# Patient Record
Sex: MALE | Race: WHITE | NOT HISPANIC OR LATINO
[De-identification: names, ages, dates, MRNs, and addresses within clinical notes are randomized per-mention and may not be internally consistent; named-entity substitution may affect disease eponyms.]

---

## 2017-08-08 ENCOUNTER — TRANSCRIPTION ENCOUNTER (OUTPATIENT)
Age: 62
End: 2017-08-08

## 2017-09-16 ENCOUNTER — TRANSCRIPTION ENCOUNTER (OUTPATIENT)
Age: 62
End: 2017-09-16

## 2018-12-14 ENCOUNTER — APPOINTMENT (OUTPATIENT)
Dept: CARDIOLOGY | Facility: CLINIC | Age: 63
End: 2018-12-14
Payer: COMMERCIAL

## 2018-12-14 VITALS
HEIGHT: 75 IN | OXYGEN SATURATION: 98 % | SYSTOLIC BLOOD PRESSURE: 122 MMHG | DIASTOLIC BLOOD PRESSURE: 68 MMHG | HEART RATE: 78 BPM | BODY MASS INDEX: 29.84 KG/M2 | WEIGHT: 240 LBS

## 2018-12-14 DIAGNOSIS — Z87.898 PERSONAL HISTORY OF OTHER SPECIFIED CONDITIONS: ICD-10-CM

## 2018-12-14 DIAGNOSIS — M1A.0710 IDIOPATHIC CHRONIC GOUT, RIGHT ANKLE AND FOOT, W/OUT TOPHUS (TOPHI): ICD-10-CM

## 2018-12-14 DIAGNOSIS — Z78.9 OTHER SPECIFIED HEALTH STATUS: ICD-10-CM

## 2018-12-14 DIAGNOSIS — Z83.438 FAMILY HISTORY OF OTHER DISORDER OF LIPOPROTEIN METABOLISM AND OTHER LIPIDEMIA: ICD-10-CM

## 2018-12-14 DIAGNOSIS — Z82.49 FAMILY HISTORY OF ISCHEMIC HEART DISEASE AND OTHER DISEASES OF THE CIRCULATORY SYSTEM: ICD-10-CM

## 2018-12-14 DIAGNOSIS — Z83.79 FAMILY HISTORY OF OTHER DISEASES OF THE DIGESTIVE SYSTEM: ICD-10-CM

## 2018-12-14 PROCEDURE — 99243 OFF/OP CNSLTJ NEW/EST LOW 30: CPT

## 2019-01-14 ENCOUNTER — APPOINTMENT (OUTPATIENT)
Dept: CARDIOLOGY | Facility: CLINIC | Age: 64
End: 2019-01-14
Payer: COMMERCIAL

## 2019-01-14 PROCEDURE — 93015 CV STRESS TEST SUPVJ I&R: CPT

## 2019-01-14 PROCEDURE — 93880 EXTRACRANIAL BILAT STUDY: CPT

## 2019-01-14 PROCEDURE — 93979 VASCULAR STUDY: CPT

## 2019-01-14 PROCEDURE — 93306 TTE W/DOPPLER COMPLETE: CPT

## 2019-01-18 ENCOUNTER — APPOINTMENT (OUTPATIENT)
Dept: CARDIOLOGY | Facility: CLINIC | Age: 64
End: 2019-01-18
Payer: COMMERCIAL

## 2019-01-18 VITALS
SYSTOLIC BLOOD PRESSURE: 134 MMHG | HEART RATE: 68 BPM | BODY MASS INDEX: 30.46 KG/M2 | WEIGHT: 245 LBS | HEIGHT: 75 IN | DIASTOLIC BLOOD PRESSURE: 72 MMHG | OXYGEN SATURATION: 96 %

## 2019-01-18 PROCEDURE — 99214 OFFICE O/P EST MOD 30 MIN: CPT

## 2019-01-29 ENCOUNTER — APPOINTMENT (OUTPATIENT)
Dept: CARDIOLOGY | Facility: CLINIC | Age: 64
End: 2019-01-29
Payer: COMMERCIAL

## 2019-01-29 PROCEDURE — 78452 HT MUSCLE IMAGE SPECT MULT: CPT

## 2019-01-29 PROCEDURE — A9502: CPT

## 2019-01-29 PROCEDURE — 93015 CV STRESS TEST SUPVJ I&R: CPT

## 2019-01-31 ENCOUNTER — APPOINTMENT (OUTPATIENT)
Dept: CARDIOLOGY | Facility: CLINIC | Age: 64
End: 2019-01-31

## 2021-02-10 ENCOUNTER — NON-APPOINTMENT (OUTPATIENT)
Age: 66
End: 2021-02-10

## 2021-02-10 ENCOUNTER — APPOINTMENT (OUTPATIENT)
Dept: CARDIOLOGY | Facility: CLINIC | Age: 66
End: 2021-02-10
Payer: COMMERCIAL

## 2021-02-10 VITALS
HEIGHT: 75 IN | SYSTOLIC BLOOD PRESSURE: 136 MMHG | OXYGEN SATURATION: 95 % | DIASTOLIC BLOOD PRESSURE: 88 MMHG | WEIGHT: 238 LBS | TEMPERATURE: 96.8 F | HEART RATE: 108 BPM | BODY MASS INDEX: 29.59 KG/M2

## 2021-02-10 PROCEDURE — 99214 OFFICE O/P EST MOD 30 MIN: CPT

## 2021-02-10 PROCEDURE — 93000 ELECTROCARDIOGRAM COMPLETE: CPT

## 2021-02-10 PROCEDURE — 99072 ADDL SUPL MATRL&STAF TM PHE: CPT

## 2021-02-10 RX ORDER — ASPIRIN 81 MG
81 TABLET, DELAYED RELEASE (ENTERIC COATED) ORAL
Refills: 0 | Status: DISCONTINUED | COMMUNITY
End: 2021-02-10

## 2021-02-10 RX ORDER — NEBIVOLOL HYDROCHLORIDE 20 MG/1
20 TABLET ORAL
Refills: 0 | Status: DISCONTINUED | COMMUNITY
End: 2021-02-10

## 2021-02-10 NOTE — ASSESSMENT
[FreeTextEntry1] : CHE MEADOWS is a 65 year old M who presents today  with the above history and the following active issues: \par \par ECG reviewed.  Sinus tachycardia.  Otherwise normal ECG.  He was recently stopped of Bystolic and started on metoprolol.  There was few days overlap of not taking any beta-blocker.  Continue heart rate monitoring.  We will schedule echocardiogram to reevaluate ejection fraction.  There is reported alcohol intake.  Cut down on alcohol intake and increase hydration discussed with the patient.  Cut down on caffeine intake.\par \par -Carotid atherosclerosis: Mild, nonobx by ultrasound imaging. No neurologic complaints. On statin  therapy, tolerating well. Continue current rx.  Carotid ultrasound will be scheduled for this year to rule out any progression of carotid disease.  He was taken off aspirin because of anemia.  He is seeing PMD for work-up.\par \par -HTN: Well controlled on exam today. Continue benicar-hct, norvsac, and bystolic rx. Educated patient on low salt diet, alcohol intake in moderation, regular cardiovascular exercise, and weight reduction for improved BP control.\par \par -HLD: Continue management through primary care. Tolerating crestor and tricor well. Continue current rx and adjunctive lifestyle modification measures.\par \par -Mitral and aortic valve calcification: Minimal regurgitation. Normal LVEF. No clinical evidence of CHF. Continue current rx regimen. No SBE prophylaxis required. Surveillance monitoring.\par \par -GERD: Continue PPI rx managed by primary.\par \par -No evidence of aortic aneurysm or significant aortic atherosclerosis. \par \par

## 2021-02-10 NOTE — REASON FOR VISIT
[Follow-Up - Clinic] : a clinic follow-up of [Dyspnea] : dyspnea [Hyperlipidemia] : hyperlipidemia [Hypertension] : hypertension [FreeTextEntry1] : to review cardiovascular testing

## 2021-02-10 NOTE — HISTORY OF PRESENT ILLNESS
[FreeTextEntry1] : 66 y/o male seen today in clinical followup .\par \par PMH of HTN, HLD, mild carotid atherosclerosis, GERD, and gout. \par There is no significant family history of CAD.\par He is a nonsmoker. \par Drink about 5 beers per day.\par \par No history of AF, CHF, MI, coronary revascularization, DM, CVA/TIA, or reactive/obstructive airway disease. 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 This usually resolves within a minute of rest and is not associated with any other symptoms. Denies exertional chest pain or discomfort. Denies orthopnea, weight gain, or LE edema. Denies palpitations, lightheadedness, dizziness, or syncope. Occasionally notes abd discomfort, possibly r/t bloating. Denies acute/enrique abd pain/n/v. Denies any unusual bleeding or black/tarry stools. \par \par \par Testing reviewed in office today performed 1/14/19:\par - ETT: Randy protocol of 7:25 min. 91% MPHR. Peak /80. SOB with exercise. 8 METS, fair for age and gender. 1mm upsloping ST-T wave depressions were noted. \par - Echo: EF >65%, septum 1.2cm, thickened MV and AV with minimal regurgitation.\par - Carotid Sono: Mild nonobx stenosis BL.\par - Abdominal Sono: No significant atherosclerosis or aneurysm of aorta.\par

## 2021-02-10 NOTE — PHYSICAL EXAM
[General Appearance - Well Developed] : well developed [Normal Appearance] : normal appearance [Well Groomed] : well groomed [General Appearance - Well Nourished] : well nourished [No Deformities] : no deformities [General Appearance - In No Acute Distress] : no acute distress [Normal Conjunctiva] : the conjunctiva exhibited no abnormalities [Eyelids - No Xanthelasma] : the eyelids demonstrated no xanthelasmas [No Oral Pallor] : no oral pallor [No Oral Cyanosis] : no oral cyanosis [Respiration, Rhythm And Depth] : normal respiratory rhythm and effort [Exaggerated Use Of Accessory Muscles For Inspiration] : no accessory muscle use [Auscultation Breath Sounds / Voice Sounds] : lungs were clear to auscultation bilaterally [Heart Rate And Rhythm] : heart rate and rhythm were normal [Heart Sounds] : normal S1 and S2 [Edema] : no peripheral edema present [Abdomen Soft] : soft [Abdomen Tenderness] : non-tender [Abdomen Mass (___ Cm)] : no abdominal mass palpated [Abnormal Walk] : normal gait [Gait - Sufficient For Exercise Testing] : the gait was sufficient for exercise testing [Nail Clubbing] : no clubbing of the fingernails [Cyanosis, Localized] : no localized cyanosis [Petechial Hemorrhages (___cm)] : no petechial hemorrhages [Skin Color & Pigmentation] : normal skin color and pigmentation [] : no rash [No Venous Stasis] : no venous stasis [Skin Lesions] : no skin lesions [No Skin Ulcers] : no skin ulcer [No Xanthoma] : no  xanthoma was observed [Oriented To Time, Place, And Person] : oriented to person, place, and time [Affect] : the affect was normal [Mood] : the mood was normal [No Anxiety] : not feeling anxious [FreeTextEntry1] : +2/6 HSM at apex

## 2021-02-10 NOTE — REVIEW OF SYSTEMS
no [Dyspnea on exertion] : dyspnea during exertion [see HPI] : see HPI [Abdominal Pain] : abdominal pain [Negative] : Heme/Lymph [Nausea] : no nausea [Change in Appetite] : no change in appetite [Change In The Stool] : no change in stool

## 2021-02-23 ENCOUNTER — APPOINTMENT (OUTPATIENT)
Dept: CARDIOLOGY | Facility: CLINIC | Age: 66
End: 2021-02-23
Payer: COMMERCIAL

## 2021-02-23 PROCEDURE — 99072 ADDL SUPL MATRL&STAF TM PHE: CPT

## 2021-02-23 PROCEDURE — 93880 EXTRACRANIAL BILAT STUDY: CPT

## 2021-02-23 PROCEDURE — 93306 TTE W/DOPPLER COMPLETE: CPT

## 2021-02-26 ENCOUNTER — APPOINTMENT (OUTPATIENT)
Dept: CARDIOLOGY | Facility: CLINIC | Age: 66
End: 2021-02-26
Payer: COMMERCIAL

## 2021-02-26 VITALS
HEIGHT: 75 IN | SYSTOLIC BLOOD PRESSURE: 148 MMHG | WEIGHT: 235 LBS | BODY MASS INDEX: 29.22 KG/M2 | DIASTOLIC BLOOD PRESSURE: 72 MMHG | OXYGEN SATURATION: 93 % | HEART RATE: 117 BPM

## 2021-02-26 PROCEDURE — 99072 ADDL SUPL MATRL&STAF TM PHE: CPT

## 2021-02-26 PROCEDURE — 99214 OFFICE O/P EST MOD 30 MIN: CPT

## 2021-02-26 NOTE — REVIEW OF SYSTEMS
[Dyspnea on exertion] : dyspnea during exertion [see HPI] : see HPI [Abdominal Pain] : abdominal pain [Negative] : Heme/Lymph [Nausea] : no nausea [Change in Appetite] : no change in appetite [Change In The Stool] : no change in stool

## 2021-02-26 NOTE — ASSESSMENT
[FreeTextEntry1] : CHE MEADOWS is a 65 year old M who presents today  with the above history and the following active issues: \par \par ECG reviewed.  Sinus tachycardia.  Otherwise normal ECG.  He was recently stopped of Bystolic and started on metoprolol.  There was few days overlap of not taking any beta-blocker.  Continue heart rate monitoring.  We will schedule echocardiogram to reevaluate ejection fraction.  There is reported alcohol intake.  Cut down on alcohol intake and increase hydration discussed with the patient.  Cut down on caffeine intake.\par \par -Carotid atherosclerosis: Mild, nonobx by ultrasound imaging. No neurologic complaints. On statin  therapy, tolerating well. Continue current rx.  Carotid ultrasound will be scheduled for this year to rule out any progression of carotid disease.  He was taken off aspirin because of anemia.  He is seeing PMD for work-up.\par \par -HTN: Well controlled on exam today. Continue benicar-hct, norvsac, and bystolic rx. Educated patient on low salt diet, alcohol intake in moderation, regular cardiovascular exercise, and weight reduction for improved BP control.\par \par -HLD: Continue management through primary care. Tolerating crestor and tricor well. Continue current rx and adjunctive lifestyle modification measures.\par \par -Mitral and aortic valve calcification: Minimal regurgitation. Normal LVEF. No clinical evidence of CHF. Continue current rx regimen. No SBE prophylaxis required. Surveillance monitoring.\par \par -GERD: Continue PPI rx managed by primary.\par \par -No evidence of aortic aneurysm or significant aortic atherosclerosis. \par \par Results of recent echocardiogram and carotid ultrasound discussed with the patient.  He admits to high sodium intake.  Low-sodium diet discussed with the patient.  Continue blood pressure monitoring.  If it remains elevated despite of low-sodium diet adjustment of antihypertensive medication will be done.  He wants to follow-up with his primary physician, cardiac follow-up yearly and as needed.\par

## 2022-06-24 ENCOUNTER — NON-APPOINTMENT (OUTPATIENT)
Age: 67
End: 2022-06-24

## 2022-06-24 ENCOUNTER — APPOINTMENT (OUTPATIENT)
Dept: CARDIOLOGY | Facility: CLINIC | Age: 67
End: 2022-06-24
Payer: COMMERCIAL

## 2022-06-24 VITALS
HEIGHT: 75 IN | BODY MASS INDEX: 30.71 KG/M2 | HEART RATE: 105 BPM | DIASTOLIC BLOOD PRESSURE: 90 MMHG | WEIGHT: 247 LBS | TEMPERATURE: 97.5 F | OXYGEN SATURATION: 95 % | SYSTOLIC BLOOD PRESSURE: 142 MMHG

## 2022-06-24 PROCEDURE — 99214 OFFICE O/P EST MOD 30 MIN: CPT

## 2022-06-24 PROCEDURE — 93000 ELECTROCARDIOGRAM COMPLETE: CPT

## 2022-06-24 NOTE — PHYSICAL EXAM
[General Appearance - Well Developed] : well developed [Normal Appearance] : normal appearance [Well Groomed] : well groomed [General Appearance - Well Nourished] : well nourished [No Deformities] : no deformities [General Appearance - In No Acute Distress] : no acute distress [Eyelids - No Xanthelasma] : the eyelids demonstrated no xanthelasmas [No Oral Pallor] : no oral pallor [No Oral Cyanosis] : no oral cyanosis [Respiration, Rhythm And Depth] : normal respiratory rhythm and effort [Exaggerated Use Of Accessory Muscles For Inspiration] : no accessory muscle use [Auscultation Breath Sounds / Voice Sounds] : lungs were clear to auscultation bilaterally [Heart Rate And Rhythm] : heart rate and rhythm were normal [Heart Sounds] : normal S1 and S2 [Edema] : no peripheral edema present [Abdomen Soft] : soft [Abdomen Tenderness] : non-tender [Abdomen Mass (___ Cm)] : no abdominal mass palpated [Abnormal Walk] : normal gait [Gait - Sufficient For Exercise Testing] : the gait was sufficient for exercise testing [Nail Clubbing] : no clubbing of the fingernails [Cyanosis, Localized] : no localized cyanosis [Petechial Hemorrhages (___cm)] : no petechial hemorrhages [Skin Color & Pigmentation] : normal skin color and pigmentation [] : no rash [No Venous Stasis] : no venous stasis [Skin Lesions] : no skin lesions [No Skin Ulcers] : no skin ulcer [No Xanthoma] : no  xanthoma was observed [Oriented To Time, Place, And Person] : oriented to person, place, and time [Affect] : the affect was normal [Mood] : the mood was normal [No Anxiety] : not feeling anxious [Well Developed] : well developed [Well Nourished] : well nourished [No Acute Distress] : no acute distress [Normal Conjunctiva] : normal conjunctiva [Normal Venous Pressure] : normal venous pressure [No Carotid Bruit] : no carotid bruit [Normal S1, S2] : normal S1, S2 [No Murmur] : no murmur [No Rub] : no rub [No Gallop] : no gallop [Clear Lung Fields] : clear lung fields [Good Air Entry] : good air entry [No Respiratory Distress] : no respiratory distress  [Soft] : abdomen soft [Non Tender] : non-tender [No Masses/organomegaly] : no masses/organomegaly [Normal Bowel Sounds] : normal bowel sounds [Normal Gait] : normal gait [No Edema] : no edema [No Cyanosis] : no cyanosis [No Clubbing] : no clubbing [No Varicosities] : no varicosities [No Rash] : no rash [No Skin Lesions] : no skin lesions [Moves all extremities] : moves all extremities [No Focal Deficits] : no focal deficits [Normal Speech] : normal speech [Alert and Oriented] : alert and oriented [Normal memory] : normal memory [FreeTextEntry1] : +2/6 HSM at apex

## 2022-06-24 NOTE — HISTORY OF PRESENT ILLNESS
[FreeTextEntry1] : 65 y/o male seen today in clinical followup .\par \par PMH of HTN, HLD, mild carotid atherosclerosis, GERD, and gout. \par There is no significant family history of CAD.\par He is a nonsmoker. \par Drink about 5 beers per day.\par \par No history of AF, CHF, MI, coronary revascularization, DM, CVA/TIA, or reactive/obstructive airway disease. 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 This usually resolves within a minute of rest and is not associated with any other symptoms. Denies exertional chest pain or discomfort. Denies orthopnea, weight gain, or LE edema. Denies palpitations, lightheadedness, dizziness, or syncope. Occasionally notes abd discomfort, possibly r/t bloating. Denies acute/enrique abd pain/n/v. Denies any unusual bleeding or black/tarry stools. \par \par \par Testing reviewed in office today performed 1/14/19:\par - ETT: Randy protocol of 7:25 min. 91% MPHR. Peak /80. SOB with exercise. 8 METS, fair for age and gender. 1mm upsloping ST-T wave depressions were noted. \par - Echo: EF >65%, septum 1.2cm, thickened MV and AV with minimal regurgitation.\par - Carotid Sono: Mild nonobx stenosis BL.\par - Abdominal Sono: No significant atherosclerosis or aneurysm of aorta.\par

## 2022-06-24 NOTE — ASSESSMENT
[FreeTextEntry1] : CHE MEADOWS is a 66 year old M who presents today  with the above history and the following active issues: \par \par ECG reviewed.  Sinus tachycardia.  Otherwise normal ECG.  He was recently stopped of Bystolic and started on metoprolol.  There was few days overlap of not taking any beta-blocker.  Continue heart rate monitoring.  We will schedule echocardiogram to reevaluate ejection fraction.  There is reported alcohol intake.  Cut down on alcohol intake and increase hydration discussed with the patient.  Cut down on caffeine intake.\par \par -Carotid atherosclerosis: Mild, nonobx by ultrasound imaging. No neurologic complaints. On statin  therapy, tolerating well. Continue current rx.  Carotid ultrasound will be scheduled for this year to rule out any progression of carotid disease.  He was taken off aspirin because of anemia.  He is seeing PMD for work-up.\par \par -HTN: Well controlled on exam today. Continue benicar-hct, norvsac, and bystolic rx. Educated patient on low salt diet, alcohol intake in moderation, regular cardiovascular exercise, and weight reduction for improved BP control.\par \par -HLD: Continue management through primary care. Tolerating crestor and tricor well. Continue current rx and adjunctive lifestyle modification measures.\par \par -Mitral and aortic valve calcification: Minimal regurgitation. Normal LVEF. No clinical evidence of CHF. Continue current rx regimen. No SBE prophylaxis required. Surveillance monitoring.\par \par -GERD: Continue PPI rx managed by primary.\par \par -No evidence of aortic aneurysm or significant aortic atherosclerosis. \par \par Results of last year echocardiogram and carotid ultrasound discussed with the patient.  He admits to high sodium intake.  Low-sodium diet discussed with the patient.  Continue blood pressure monitoring.  We will increase the dose of amlodipine to 10 mg daily.  Shortness of breath on exertion since COVID infection.  Echocardiogram will be scheduled to evaluate ejection fraction and rule out any progression of valvulopathy.  Follow-up after echocardiogram.\par

## 2022-07-08 ENCOUNTER — APPOINTMENT (OUTPATIENT)
Dept: CARDIOLOGY | Facility: CLINIC | Age: 67
End: 2022-07-08

## 2022-07-08 VITALS
BODY MASS INDEX: 30.59 KG/M2 | HEART RATE: 108 BPM | DIASTOLIC BLOOD PRESSURE: 72 MMHG | OXYGEN SATURATION: 96 % | RESPIRATION RATE: 14 BRPM | WEIGHT: 246 LBS | HEIGHT: 75 IN | TEMPERATURE: 98.4 F | SYSTOLIC BLOOD PRESSURE: 146 MMHG

## 2022-07-08 DIAGNOSIS — R94.39 ABNORMAL RESULT OF OTHER CARDIOVASCULAR FUNCTION STUDY: ICD-10-CM

## 2022-07-08 PROCEDURE — 99214 OFFICE O/P EST MOD 30 MIN: CPT

## 2022-07-08 PROCEDURE — 93306 TTE W/DOPPLER COMPLETE: CPT

## 2022-07-08 RX ORDER — OLMESARTAN MEDOXOMIL-HYDROCHLOROTHIAZIDE 12.5; 4 MG/1; MG/1
40-12.5 TABLET, FILM COATED ORAL
Refills: 0 | Status: DISCONTINUED | COMMUNITY
End: 2022-07-08

## 2022-07-08 RX ORDER — AMLODIPINE BESYLATE 5 MG/1
5 TABLET ORAL
Refills: 0 | Status: DISCONTINUED | COMMUNITY
End: 2022-07-08

## 2022-07-08 NOTE — HISTORY OF PRESENT ILLNESS
[FreeTextEntry1] : 67 y/o male seen today in clinical followup .\par \par PMH of HTN, HLD, mild carotid atherosclerosis, GERD, and gout. \par There is no significant family history of CAD.\par He is a nonsmoker. \par Drink about 5 beers per day.\par \par No history of AF, CHF, MI, coronary revascularization, DM, CVA/TIA, or reactive/obstructive airway disease. 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 This usually resolves within a minute of rest and is not associated with any other symptoms. Denies exertional chest pain or discomfort. Denies orthopnea, weight gain, or LE edema. Denies palpitations, lightheadedness, dizziness, or syncope. Occasionally notes abd discomfort, possibly r/t bloating. Denies acute/enrique abd pain/n/v. Denies any unusual bleeding or black/tarry stools. \par \par \par Testing reviewed in office today performed 1/14/19:\par - ETT: Randy protocol of 7:25 min. 91% MPHR. Peak /80. SOB with exercise. 8 METS, fair for age and gender. 1mm upsloping ST-T wave depressions were noted. \par - Echo: EF >65%, septum 1.2cm, thickened MV and AV with minimal regurgitation.\par - Carotid Sono: Mild nonobx stenosis BL.\par - Abdominal Sono: No significant atherosclerosis or aneurysm of aorta.\par

## 2022-07-08 NOTE — PHYSICAL EXAM
[Well Developed] : well developed [Well Nourished] : well nourished [No Acute Distress] : no acute distress [Normal Venous Pressure] : normal venous pressure [No Carotid Bruit] : no carotid bruit [Normal S1, S2] : normal S1, S2 [No Murmur] : no murmur [No Rub] : no rub [No Gallop] : no gallop [Clear Lung Fields] : clear lung fields [Good Air Entry] : good air entry [No Respiratory Distress] : no respiratory distress  [Soft] : abdomen soft [Non Tender] : non-tender [No Masses/organomegaly] : no masses/organomegaly [Normal Bowel Sounds] : normal bowel sounds [Normal Gait] : normal gait [No Edema] : no edema [No Cyanosis] : no cyanosis [No Clubbing] : no clubbing [No Varicosities] : no varicosities [No Rash] : no rash [No Skin Lesions] : no skin lesions [Moves all extremities] : moves all extremities [No Focal Deficits] : no focal deficits [Normal Speech] : normal speech [Alert and Oriented] : alert and oriented [Normal memory] : normal memory [General Appearance - Well Developed] : well developed [Normal Appearance] : normal appearance [Well Groomed] : well groomed [General Appearance - Well Nourished] : well nourished [No Deformities] : no deformities [General Appearance - In No Acute Distress] : no acute distress [Normal Conjunctiva] : the conjunctiva exhibited no abnormalities [Eyelids - No Xanthelasma] : the eyelids demonstrated no xanthelasmas [No Oral Pallor] : no oral pallor [No Oral Cyanosis] : no oral cyanosis [Respiration, Rhythm And Depth] : normal respiratory rhythm and effort [Exaggerated Use Of Accessory Muscles For Inspiration] : no accessory muscle use [Auscultation Breath Sounds / Voice Sounds] : lungs were clear to auscultation bilaterally [Heart Rate And Rhythm] : heart rate and rhythm were normal [Heart Sounds] : normal S1 and S2 [Edema] : no peripheral edema present [Abdomen Soft] : soft [Abdomen Tenderness] : non-tender [Abdomen Mass (___ Cm)] : no abdominal mass palpated [Abnormal Walk] : normal gait [Gait - Sufficient For Exercise Testing] : the gait was sufficient for exercise testing [Nail Clubbing] : no clubbing of the fingernails [Cyanosis, Localized] : no localized cyanosis [Petechial Hemorrhages (___cm)] : no petechial hemorrhages [Skin Color & Pigmentation] : normal skin color and pigmentation [] : no rash [No Venous Stasis] : no venous stasis [Skin Lesions] : no skin lesions [No Skin Ulcers] : no skin ulcer [No Xanthoma] : no  xanthoma was observed [Oriented To Time, Place, And Person] : oriented to person, place, and time [Affect] : the affect was normal [Mood] : the mood was normal [No Anxiety] : not feeling anxious [FreeTextEntry1] : +2/6 HSM at apex

## 2022-07-08 NOTE — DISCUSSION/SUMMARY
[FreeTextEntry1] : Results of echocardiogram which was done today discussed with the patient.  Stable results.  Normal ejection fraction.  Clinically doing very well.  He will continue with the present medications.  Cardiac follow-up yearly and as needed.

## 2023-07-11 ENCOUNTER — NON-APPOINTMENT (OUTPATIENT)
Age: 68
End: 2023-07-11

## 2023-07-11 ENCOUNTER — APPOINTMENT (OUTPATIENT)
Dept: CARDIOLOGY | Facility: CLINIC | Age: 68
End: 2023-07-11
Payer: COMMERCIAL

## 2023-07-11 VITALS
OXYGEN SATURATION: 94 % | WEIGHT: 248 LBS | HEART RATE: 102 BPM | BODY MASS INDEX: 30.84 KG/M2 | SYSTOLIC BLOOD PRESSURE: 154 MMHG | HEIGHT: 75 IN | DIASTOLIC BLOOD PRESSURE: 70 MMHG

## 2023-07-11 PROCEDURE — 99214 OFFICE O/P EST MOD 30 MIN: CPT

## 2023-07-11 NOTE — PHYSICAL EXAM
[Well Developed] : well developed [Well Nourished] : well nourished [No Acute Distress] : no acute distress [Normal Venous Pressure] : normal venous pressure [No Carotid Bruit] : no carotid bruit [Normal S1, S2] : normal S1, S2 [No Murmur] : no murmur [No Rub] : no rub [No Gallop] : no gallop [Clear Lung Fields] : clear lung fields [Good Air Entry] : good air entry [No Respiratory Distress] : no respiratory distress  [Soft] : abdomen soft [Non Tender] : non-tender [No Masses/organomegaly] : no masses/organomegaly [Normal Bowel Sounds] : normal bowel sounds [Normal Gait] : normal gait [No Edema] : no edema [No Cyanosis] : no cyanosis [No Clubbing] : no clubbing [No Varicosities] : no varicosities [No Rash] : no rash [No Skin Lesions] : no skin lesions [Moves all extremities] : moves all extremities [No Focal Deficits] : no focal deficits [Normal Speech] : normal speech [Alert and Oriented] : alert and oriented [Normal memory] : normal memory [General Appearance - Well Developed] : well developed [Normal Appearance] : normal appearance [Well Groomed] : well groomed [General Appearance - Well Nourished] : well nourished [No Deformities] : no deformities [General Appearance - In No Acute Distress] : no acute distress [Normal Conjunctiva] : the conjunctiva exhibited no abnormalities [Eyelids - No Xanthelasma] : the eyelids demonstrated no xanthelasmas [No Oral Pallor] : no oral pallor [No Oral Cyanosis] : no oral cyanosis [Respiration, Rhythm And Depth] : normal respiratory rhythm and effort [Exaggerated Use Of Accessory Muscles For Inspiration] : no accessory muscle use [Auscultation Breath Sounds / Voice Sounds] : lungs were clear to auscultation bilaterally [Heart Rate And Rhythm] : heart rate and rhythm were normal [Heart Sounds] : normal S1 and S2 [Edema] : no peripheral edema present [FreeTextEntry1] : +2/6 HSM at apex [Abdomen Soft] : soft [Abdomen Tenderness] : non-tender [Abdomen Mass (___ Cm)] : no abdominal mass palpated [Abnormal Walk] : normal gait [Gait - Sufficient For Exercise Testing] : the gait was sufficient for exercise testing [Nail Clubbing] : no clubbing of the fingernails [Cyanosis, Localized] : no localized cyanosis [Petechial Hemorrhages (___cm)] : no petechial hemorrhages [Skin Color & Pigmentation] : normal skin color and pigmentation [] : no rash [No Venous Stasis] : no venous stasis [Skin Lesions] : no skin lesions [No Skin Ulcers] : no skin ulcer [No Xanthoma] : no  xanthoma was observed [Oriented To Time, Place, And Person] : oriented to person, place, and time [Affect] : the affect was normal [Mood] : the mood was normal [No Anxiety] : not feeling anxious

## 2023-07-11 NOTE — ASSESSMENT
[FreeTextEntry1] : CHE MEADOWS is a 67 year old M who presents today  with the above history and the following active issues: \par \par ECG reviewed.  Sinus tachycardia.  Otherwise normal ECG.  He was recently stopped of Bystolic and started on metoprolol.  There was few days overlap of not taking any beta-blocker.  Continue heart rate monitoring.  We will schedule echocardiogram to reevaluate ejection fraction.  There is reported alcohol intake.  Cut down on alcohol intake and increase hydration discussed with the patient.  Cut down on caffeine intake.\par \par -Carotid atherosclerosis: Mild, nonobx by ultrasound imaging. No neurologic complaints. On statin  therapy, tolerating well. Continue current rx.  Carotid ultrasound will be scheduled for this year to rule out any progression of carotid disease.  He was taken off aspirin because of anemia.  He is seeing PMD for work-up.\par \par -HTN: Well controlled on exam today. Continue benicar-hct, norvsac, and bystolic rx. Educated patient on low salt diet, alcohol intake in moderation, regular cardiovascular exercise, and weight reduction for improved BP control.\par \par -HLD: Continue management through primary care. Tolerating crestor and tricor well. Continue current rx and adjunctive lifestyle modification measures.\par \par -Mitral and aortic valve calcification: Minimal regurgitation. Normal LVEF. No clinical evidence of CHF. Continue current rx regimen. No SBE prophylaxis required. Surveillance monitoring.\par \par -GERD: Continue PPI rx managed by primary.\par \par -No evidence of aortic aneurysm or significant aortic atherosclerosis. \par \par Results of last year echocardiogram and carotid ultrasound discussed with the patient.  He admits to high sodium intake.  Low-sodium diet discussed with the patient.  Continue blood pressure monitoring.  We will increase the dose of amlodipine to 10 mg daily.  Shortness of breath on exertion since COVID infection.  Echocardiogram will be scheduled to evaluate ejection fraction and rule out any progression of valvulopathy.  Follow-up after echocardiogram.\par

## 2024-04-12 ENCOUNTER — APPOINTMENT (OUTPATIENT)
Dept: CARDIOLOGY | Facility: CLINIC | Age: 69
End: 2024-04-12
Payer: COMMERCIAL

## 2024-04-12 ENCOUNTER — NON-APPOINTMENT (OUTPATIENT)
Age: 69
End: 2024-04-12

## 2024-04-12 VITALS
DIASTOLIC BLOOD PRESSURE: 64 MMHG | BODY MASS INDEX: 30.46 KG/M2 | SYSTOLIC BLOOD PRESSURE: 132 MMHG | HEIGHT: 75 IN | OXYGEN SATURATION: 97 % | WEIGHT: 245 LBS | HEART RATE: 98 BPM

## 2024-04-12 DIAGNOSIS — R01.1 CARDIAC MURMUR, UNSPECIFIED: ICD-10-CM

## 2024-04-12 DIAGNOSIS — I65.23 OCCLUSION AND STENOSIS OF BILATERAL CAROTID ARTERIES: ICD-10-CM

## 2024-04-12 DIAGNOSIS — R06.02 SHORTNESS OF BREATH: ICD-10-CM

## 2024-04-12 LAB — HBA1C MFR BLD HPLC: 5.4

## 2024-04-12 PROCEDURE — 99204 OFFICE O/P NEW MOD 45 MIN: CPT

## 2024-04-12 PROCEDURE — G2211 COMPLEX E/M VISIT ADD ON: CPT

## 2024-04-12 PROCEDURE — 93000 ELECTROCARDIOGRAM COMPLETE: CPT

## 2024-04-12 RX ORDER — METOPROLOL SUCCINATE 25 MG/1
25 TABLET, EXTENDED RELEASE ORAL
Qty: 90 | Refills: 2 | Status: ACTIVE | COMMUNITY

## 2024-04-12 RX ORDER — ROSUVASTATIN CALCIUM 20 MG/1
20 TABLET, FILM COATED ORAL
Qty: 90 | Refills: 3 | Status: ACTIVE | COMMUNITY

## 2024-04-12 RX ORDER — AMLODIPINE BESYLATE 10 MG/1
10 TABLET ORAL DAILY
Qty: 90 | Refills: 2 | Status: ACTIVE | COMMUNITY
End: 2025-01-07

## 2024-04-12 RX ORDER — VALSARTAN 160 MG/1
160 TABLET, COATED ORAL DAILY
Qty: 90 | Refills: 2 | Status: ACTIVE | COMMUNITY
Start: 2024-04-12

## 2024-04-12 RX ORDER — FENOFIBRATE 160 MG/1
160 TABLET ORAL DAILY
Qty: 90 | Refills: 2 | Status: ACTIVE | COMMUNITY

## 2024-04-12 NOTE — DISCUSSION/SUMMARY
[FreeTextEntry1] : 61-year-old gentleman with hypertension hyperlipidemia and mild atherosclerotic disease presenting today for routine cardiovascular follow-up.  Was previously followed by Dr. Redding.  A1c at target.  Blood pressure in the stage I hypertension range with systolics in the 135 mmHg to 140 mmHg.  Patient is on hydrochlorothiazide which if possible should be changed given his age.  I also do not believe he was benefiting from the metoprolol succinate 50 mg --I reduce that dose to half.  We do not have an active lipid panel nor has his thyroid ever been checked as he has difficulty losing weight.  Plan: - Continue amlodipine 10 mg every evening - Continue Crestor 20 mg daily - Stop olmesartan-hydrochlorothiazide; start valsartan 160 mg every evening - Reduce metoprolol succinate 50 mg to 25 mg every evening - Obtain lipid panel and TSH with reflex T4  RTC: 1 month  Appreciate the opportunity to participate in the care of Mr. MEADOWS. Strict ER precautions were provided to patient for symptoms that arise or worsen. Please do not hesitate to reach out with any questions, concerns, or changes in clinical status.   Lazaro Cruz MD, FACC  Interventional & Clinical Cardiology  [EKG obtained to assist in diagnosis and management of assessed problem(s)] : EKG obtained to assist in diagnosis and management of assessed problem(s)

## 2024-04-12 NOTE — CARDIOLOGY SUMMARY
[de-identified] : Nuclear stress test (01/29/2019): Fixed inferior wall perfusion defect consistent with diaphragmatic attenuation.  Normal LV systolic function.  [de-identified] : 07/08/2022: TTE shows grossly normal left ventricular systolic function size and wall thickness.  Normal valvular structure and function [de-identified] : Labs 03/25/2024: A1c 5.4  Labs 03/25/2022: Hemoglobin 14.1, HDL 51, LDL 89, total cholesterol 173.

## 2024-04-12 NOTE — HISTORY OF PRESENT ILLNESS
[FreeTextEntry1] : 66-year-old gentleman previously patient of Dr. Redding who presents today to our office for cardiovascular follow-up. Patient's medical issues include hypertension / hyperlipidemia.  Testing performed in January 2019 included a treadmill stress test that was negative for ischemia.  8 METS achieved at that time.  Normal TTE with mild LVH (septal wall thickness 1.2 cm), carotid sono that showed mild nonobstructive disease bilaterally, abdominal sono that showed no significant atherosclerosis or aneurysm of the aorta.  We reviewed patient's medications at this visit there are some changes that we can make to consolidate his medications.  Maintaining adequate blood pressure control and also minimizing polypharmacy.  There is no indication for him to be on metoprolol.  Significant dietary and exercise minimum requirement were discussed.  He endorses no chest pain palpitation shortness of breath leg swelling orthopnea.  He snores minimally and does not feel unrested upon awakening

## 2024-04-16 ENCOUNTER — NON-APPOINTMENT (OUTPATIENT)
Age: 69
End: 2024-04-16

## 2024-04-16 ENCOUNTER — APPOINTMENT (OUTPATIENT)
Dept: OPHTHALMOLOGY | Facility: CLINIC | Age: 69
End: 2024-04-16
Payer: COMMERCIAL

## 2024-04-16 PROCEDURE — 92014 COMPRE OPH EXAM EST PT 1/>: CPT

## 2024-05-10 ENCOUNTER — APPOINTMENT (OUTPATIENT)
Dept: CARDIOLOGY | Facility: CLINIC | Age: 69
End: 2024-05-10
Payer: COMMERCIAL

## 2024-05-10 VITALS
SYSTOLIC BLOOD PRESSURE: 130 MMHG | OXYGEN SATURATION: 96 % | DIASTOLIC BLOOD PRESSURE: 68 MMHG | HEIGHT: 75 IN | WEIGHT: 248 LBS | HEART RATE: 104 BPM | BODY MASS INDEX: 30.84 KG/M2

## 2024-05-10 DIAGNOSIS — E78.5 HYPERLIPIDEMIA, UNSPECIFIED: ICD-10-CM

## 2024-05-10 DIAGNOSIS — Z00.00 ENCOUNTER FOR GENERAL ADULT MEDICAL EXAMINATION W/OUT ABNORMAL FINDINGS: ICD-10-CM

## 2024-05-10 DIAGNOSIS — K21.9 GASTRO-ESOPHAGEAL REFLUX DISEASE W/OUT ESOPHAGITIS: ICD-10-CM

## 2024-05-10 DIAGNOSIS — I10 ESSENTIAL (PRIMARY) HYPERTENSION: ICD-10-CM

## 2024-05-10 PROCEDURE — G2211 COMPLEX E/M VISIT ADD ON: CPT

## 2024-05-10 PROCEDURE — 99214 OFFICE O/P EST MOD 30 MIN: CPT

## 2024-05-10 RX ORDER — OMEPRAZOLE 20 MG/1
20 CAPSULE, DELAYED RELEASE ORAL
Qty: 90 | Refills: 3 | Status: ACTIVE | COMMUNITY

## 2024-05-10 NOTE — CARDIOLOGY SUMMARY
[de-identified] : Nuclear stress test (01/29/2019): Fixed inferior wall perfusion defect consistent with diaphragmatic attenuation.  Normal LV systolic function.  [de-identified] : 07/08/2022: TTE shows grossly normal left ventricular systolic function size and wall thickness.  Normal valvular structure and function [de-identified] : Labs 03/25/2024: A1c 5.4  Labs 03/25/2022: Hemoglobin 14.1, HDL 51, LDL 89, total cholesterol 173.

## 2024-05-10 NOTE — DISCUSSION/SUMMARY
[FreeTextEntry1] : 68 year old gentleman with the following issues:   # HTN; almost optimized -- switch meds to night time for greater efficacy.   Plan: - Continue amlodipine 10 mg every evening - Continue Crestor 20 mg daily - Continue valsartan 160 mg every evening - Continue metoprolol succinate 25 mg every evening   RTC: 3 month  Appreciate the opportunity to participate in the care of Mr. MEADOWS. Strict ER precautions were provided to patient for symptoms that arise or worsen. Please do not hesitate to reach out with any questions, concerns, or changes in clinical status.   Lazaro Cruz MD, [facility]  Interventional & Clinical Cardiology  No

## 2024-05-10 NOTE — HISTORY OF PRESENT ILLNESS
[FreeTextEntry1] : 66-year-old gentleman previously patient of Dr. Redding who presents today to our office for cardiovascular follow-up. Patient's medical issues include hypertension / hyperlipidemia.  Testing performed in January 2019 included a treadmill stress test that was negative for ischemia.  8 METS achieved at that time.  Normal TTE with mild LVH (septal wall thickness 1.2 cm), carotid sono that showed mild nonobstructive disease bilaterally, abdominal sono that showed no significant atherosclerosis or aneurysm of the aorta.  BP well controlled on today's visit -- 130/80. Asked patient to switch BP meds to nighttime dosing for greater efficacy.

## 2024-09-26 ENCOUNTER — NON-APPOINTMENT (OUTPATIENT)
Age: 69
End: 2024-09-26

## 2024-09-27 ENCOUNTER — APPOINTMENT (OUTPATIENT)
Dept: CARDIOLOGY | Facility: CLINIC | Age: 69
End: 2024-09-27
Payer: COMMERCIAL

## 2024-09-27 ENCOUNTER — APPOINTMENT (OUTPATIENT)
Dept: CARDIOLOGY | Facility: CLINIC | Age: 69
End: 2024-09-27

## 2024-09-27 VITALS
SYSTOLIC BLOOD PRESSURE: 142 MMHG | DIASTOLIC BLOOD PRESSURE: 60 MMHG | OXYGEN SATURATION: 96 % | WEIGHT: 250 LBS | HEIGHT: 75 IN | HEART RATE: 108 BPM | BODY MASS INDEX: 31.08 KG/M2

## 2024-09-27 DIAGNOSIS — K21.9 GASTRO-ESOPHAGEAL REFLUX DISEASE W/OUT ESOPHAGITIS: ICD-10-CM

## 2024-09-27 DIAGNOSIS — M1A.0710 IDIOPATHIC CHRONIC GOUT, RIGHT ANKLE AND FOOT, W/OUT TOPHUS (TOPHI): ICD-10-CM

## 2024-09-27 DIAGNOSIS — E78.5 HYPERLIPIDEMIA, UNSPECIFIED: ICD-10-CM

## 2024-09-27 DIAGNOSIS — I10 ESSENTIAL (PRIMARY) HYPERTENSION: ICD-10-CM

## 2024-09-27 PROCEDURE — G2211 COMPLEX E/M VISIT ADD ON: CPT | Mod: NC

## 2024-09-27 PROCEDURE — 99214 OFFICE O/P EST MOD 30 MIN: CPT

## 2024-09-27 RX ORDER — OLMESARTAN MEDOXOMIL AND HYDROCHLOROTHIAZIDE 40; 12.5 MG/1; MG/1
40-12.5 TABLET ORAL DAILY
Qty: 1 | Refills: 2 | Status: ACTIVE | COMMUNITY
Start: 2024-09-27 | End: 1900-01-01

## 2024-09-27 NOTE — DISCUSSION/SUMMARY
[FreeTextEntry1] :  68 year old gentleman who warrants more aggressive antihypertensive therapies -- one that doesn't include amlodipine 10mg daily as it caused him noticeable ankle swelling. Renal function very reassuring and given his habitus, PO intake, age, normal electrolytes and Cr -- would be lower risk from adverse effect of thiazide antihypertensive.   Plan:   - Stop amlodipine 10 mg qPM   - Stop valsartan 160 mg qPM   - Start olmesartan 40 mg - HCTZ 12.5 mg daily today   - Repeat BP check and BMP check at PCP's office if possible happening this Thursday Oct 4th.   - If SBP > 130 mmHg, add back lower dose of amlo 5 mg qPM which should not cause swelling   - Follow-up over phone in two weeks; call-back provided.   RTC: 1-2 months  Appreciate the opportunity to participate in the care of Mr. MEADOWS. Strict ER precautions were provided to patient for symptoms that arise or worsen. Please do not hesitate to reach out with any questions, concerns, or changes in clinical status.   Lazaro Cruz MD, FACC  Interventional & Clinical Cardiology

## 2024-09-27 NOTE — HISTORY OF PRESENT ILLNESS
[FreeTextEntry1] :  68 year old gentleman with HTN, HLD, and gout, presenting for follow-up of HTN.  BP still consistently in 140 mmHg range for systolics despite amlo 10 mg daily + valsartan 160 mg daily.  Additionally, patient has noticeable ankle swelling from the amlodipine.  Otherwise, highly active and just returned from a recent trip to [place], and doing well overall with no symptoms.   09/2024 labs reviewed -- Cr < 0.9, Normal Na of 140s. Hgb mildly lower than normal range -- is following this up with GI (recent scope).   CARDIOVASCULAR STUDIES:   Stress Test: Nuclear stress test (01/29/2019): Fixed inferior wall perfusion defect consistent with diaphragmatic attenuation. Normal LV systolic function.   TTE : 07/08/2022: TTE shows grossly normal left ventricular systolic function size and wall thickness. Normal valvular structure and function   Labs 03/25/2024: A1c 5.4 Labs 03/25/2022: Hemoglobin 14.1, HDL 51, LDL 89, total cholesterol 173.

## 2024-11-08 ENCOUNTER — APPOINTMENT (OUTPATIENT)
Dept: CARDIOLOGY | Facility: CLINIC | Age: 69
End: 2024-11-08

## 2025-04-22 ENCOUNTER — NON-APPOINTMENT (OUTPATIENT)
Age: 70
End: 2025-04-22

## 2025-04-22 ENCOUNTER — APPOINTMENT (OUTPATIENT)
Dept: OPHTHALMOLOGY | Facility: CLINIC | Age: 70
End: 2025-04-22
Payer: COMMERCIAL

## 2025-04-22 PROCEDURE — 92014 COMPRE OPH EXAM EST PT 1/>: CPT

## 2025-05-19 ENCOUNTER — NON-APPOINTMENT (OUTPATIENT)
Age: 70
End: 2025-05-19